# Patient Record
Sex: FEMALE | URBAN - METROPOLITAN AREA
[De-identification: names, ages, dates, MRNs, and addresses within clinical notes are randomized per-mention and may not be internally consistent; named-entity substitution may affect disease eponyms.]

---

## 2024-08-28 ENCOUNTER — TELEPHONE (OUTPATIENT)
Dept: BARIATRICS/WEIGHT MGMT | Age: 39
End: 2024-08-28

## 2024-08-28 NOTE — TELEPHONE ENCOUNTER
Past bariatric surgery: Yes  What bariatric surgery has patient had:  bypass  What year did patient have surgery: 2004  Location and provider of surgery: [place] [facility]  What kind of complications are you having?  GERD, Weight Gain, and has had 9 ulcers cauterized after hemorrhaging.   If GERD, have you seen a GI provider to help resolve the GERD issues? Yes ( can't remember  Have you been taking any medications for GERD? Yes  Have you had an Upper GI or an EGD done? Yes roughly 5 years ago.      Pt states that after the cauterization they never put the bypass back they way it should of been.  From the scar from he cauterizations has developed  a double wall incisional hernia that the dr states is 14 cm long.  Had pancreatitis in March 2024 and they did imaging,  at [facility] in [facility],  and saw the size of the hernia. Specialist ( doesn't know name)  and told her she had to lose 100lbs first.     Started at around 436, Lowest weight 189 and now about 318 since the ulcer cauterization.